# Patient Record
Sex: MALE | Race: WHITE | NOT HISPANIC OR LATINO | URBAN - METROPOLITAN AREA
[De-identification: names, ages, dates, MRNs, and addresses within clinical notes are randomized per-mention and may not be internally consistent; named-entity substitution may affect disease eponyms.]

---

## 2020-01-28 ENCOUNTER — TRANSFERRED RECORDS (OUTPATIENT)
Dept: HEALTH INFORMATION MANAGEMENT | Facility: CLINIC | Age: 50
End: 2020-01-28

## 2020-02-18 ENCOUNTER — OFFICE VISIT (OUTPATIENT)
Dept: OPHTHALMOLOGY | Facility: CLINIC | Age: 50
End: 2020-02-18
Attending: OPHTHALMOLOGY
Payer: COMMERCIAL

## 2020-02-18 DIAGNOSIS — H53.10 SUBJECTIVE VISUAL DISTURBANCE: Primary | ICD-10-CM

## 2020-02-18 DIAGNOSIS — H53.10 SUBJECTIVE VISUAL DISTURBANCE: ICD-10-CM

## 2020-02-18 DIAGNOSIS — H53.40 VISUAL FIELD DEFECT: Primary | ICD-10-CM

## 2020-02-18 DIAGNOSIS — H33.193 BULLOUS RETINOSCHISIS OF BOTH EYES: ICD-10-CM

## 2020-02-18 DIAGNOSIS — H33.22 RETINAL DETACHMENT, LEFT: ICD-10-CM

## 2020-02-18 DIAGNOSIS — H33.22 LEFT RETINAL DETACHMENT: Primary | ICD-10-CM

## 2020-02-18 PROCEDURE — 92134 CPTRZ OPH DX IMG PST SGM RTA: CPT | Mod: ZF | Performed by: OPHTHALMOLOGY

## 2020-02-18 PROCEDURE — G0463 HOSPITAL OUTPT CLINIC VISIT: HCPCS | Mod: ZF | Performed by: TECHNICIAN/TECHNOLOGIST

## 2020-02-18 PROCEDURE — 92133 CPTRZD OPH DX IMG PST SGM ON: CPT | Mod: ZF | Performed by: OPHTHALMOLOGY

## 2020-02-18 PROCEDURE — 40000269 ZZH STATISTIC NO CHARGE FACILITY FEE: Mod: ZF

## 2020-02-18 PROCEDURE — 92083 EXTENDED VISUAL FIELD XM: CPT | Mod: ZF | Performed by: OPHTHALMOLOGY

## 2020-02-18 PROCEDURE — 92250 FUNDUS PHOTOGRAPHY W/I&R: CPT | Mod: ZF | Performed by: OPHTHALMOLOGY

## 2020-02-18 ASSESSMENT — TONOMETRY
OD_IOP_MMHG: 18
IOP_METHOD: TONOPEN
OS_IOP_MMHG: 18
IOP_METHOD: ICARE
OD_IOP_MMHG: 18
OS_IOP_MMHG: 18

## 2020-02-18 ASSESSMENT — VISUAL ACUITY
OD_SC: 20/20
METHOD: SNELLEN - LINEAR
OS_SC: 20/20
OD_SC: 20/20
OS_SC: 20/20
METHOD: SNELLEN - LINEAR

## 2020-02-18 ASSESSMENT — SLIT LAMP EXAM - LIDS
COMMENTS: NORMAL

## 2020-02-18 ASSESSMENT — CUP TO DISC RATIO
OD_RATIO: 0.4
OS_RATIO: 0.65
OS_RATIO: 0.6
OD_RATIO: 0.6

## 2020-02-18 ASSESSMENT — CONF VISUAL FIELD
OD_SUPERIOR_NASAL_RESTRICTION: 3
METHOD: COUNTING FINGERS

## 2020-02-18 ASSESSMENT — EXTERNAL EXAM - LEFT EYE
OS_EXAM: NORMAL
OS_EXAM: NORMAL

## 2020-02-18 ASSESSMENT — EXTERNAL EXAM - RIGHT EYE
OD_EXAM: NORMAL
OD_EXAM: NORMAL

## 2020-02-18 NOTE — PROGRESS NOTES
Assessment & Plan     Jc Burciaga is a 49 year old male with the following diagnoses:   1. Visual field defect    2. Subjective visual disturbance    3. Retinal detachment, left         Patient is a 49 year old male sent by Dr. Les Nevarez from [facility] in [place] for consultation of visual field defect. Two years ago, patient had inconclusive visual field loss in left eye and was followed until last July, where visual field began showing deficits that were confirmed on 1/28/20. He initially did not notice any deficit but now is aware of it and is unsure if it is progressing. He notices occasional floaters and intermittent blurring in his left eye. Eyes are dry in the morning which resolves through the day but has no eye pain, excess tearing, redness, headaches or double vision. No vision loss on right. No trouble driving at night, no color vision loss.    POH: none  PMH: GERD (on prilosec), no hypertension, diabetes  FH: no history of glaucoma    Visual acuity is 20/20 in both eyes. Intraocular pressure is 18 both eyes. Pupils normal without afferent pupillary defect. Color plates full both eyes. Confrontational visual fields with superonasal defect right eye. Motility full both eyes. He has a subtle inferior Retinal detachment  LEFT eye.      Visual fields show superior nasal field loss in left eye, normal right eye. OCT rNFL shows thinning of temporal inferior disc in left eye.    It is my impression that he has an inferior Retinal detachment  LEFT eye causing a superior visual field defect.  Recommend see retina today.  Follow up with me as needed for worsening symptoms.           Attending Physician Attestation:  Complete documentation of historical and exam elements from today's encounter can be found in the full encounter summary report (not reduplicated in this progress note).  I personally obtained the chief complaint(s) and history of present illness.  I confirmed and edited as  necessary the review of systems, past medical/surgical history, family history, social history, and examination findings as documented by others; and I examined the patient myself.  I personally reviewed the relevant tests, images, and reports as documented above.  I formulated and edited as necessary the assessment and plan and discussed the findings and management plan with the patient and family. I was present with the medical student who participated in the service and in the documentation of this note. - MD Keith Yoon, MS4  Medical Student

## 2020-02-18 NOTE — Clinical Note
2/18/2020       RE: Jc Burciaga  [address] [address] [place] [address]     Dear Colleague,    Thank you for referring your patient, Jc Burciaga, to the EYE CLINIC at [facility]. Please see a copy of my visit note below.           Assessment & Plan     Jc Burciaga is a 49 year old male with the following diagnoses:   No diagnosis found.     Patient is a 49 year old male sent by Dr. Les Nevarez from [facility] in [place] for consultation of visual field defect. Two years ago, patient had inconclusive visual field loss in left eye and was followed until last July, where visual field began showing deficits that were confirmed on 1/28/20. He initially did not notice any deficit but now is aware of it and is unsure if it is progressing. He notices occasional floaters and intermittent blurring in his left eye. Eyes are dry in the morning which resolves through the day but has no eye pain, excess tearing, redness, headaches or double vision. No vision loss on right. No trouble driving at night, no color vision loss.    POH: none  PMH: GERD (on prilosec), no hypertension, diabetes  FH: no history of glaucoma    Visual acuity is 20/20 in both eyes. Intraocular pressure is 18 both eyes. Pupils normal without afferent pupillary defect. Color plates full both eyes. Confrontational visual fields with superonasal defect right eye. Motility full both eyes. Slit lamp exam ***. Dilated fundus exam ***    Visual fields show superior nasal field loss in left eye, normal right eye. OCT rNFL shows thinning of temporal inferior disc in left eye.    It is my impression that ***.          Keith Vazquez, MS4  Medical Student           Assessment & Plan     Jc Burciaga is a 49 year old male with the following diagnoses:   1. Visual field defect    2. Subjective visual disturbance    3. Retinal detachment, left         Patient is a 49 year old male sent by Dr. Les Nevarez  from [facility] in [place] for consultation of visual field defect. Two years ago, patient had inconclusive visual field loss in left eye and was followed until last July, where visual field began showing deficits that were confirmed on 1/28/20. He initially did not notice any deficit but now is aware of it and is unsure if it is progressing. He notices occasional floaters and intermittent blurring in his left eye. Eyes are dry in the morning which resolves through the day but has no eye pain, excess tearing, redness, headaches or double vision. No vision loss on right. No trouble driving at night, no color vision loss.    POH: none  PMH: GERD (on prilosec), no hypertension, diabetes  FH: no history of glaucoma    Visual acuity is 20/20 in both eyes. Intraocular pressure is 18 both eyes. Pupils normal without afferent pupillary defect. Color plates full both eyes. Confrontational visual fields with superonasal defect right eye. Motility full both eyes. He has a subtle inferior Retinal detachment  LEFT eye.      Visual fields show superior nasal field loss in left eye, normal right eye. OCT rNFL shows thinning of temporal inferior disc in left eye.    It is my impression that he has an inferior Retinal detachment  LEFT eye causing a superior visual field defect.  Recommend see retina today.  Follow up with me as needed for worsening symptoms.           Attending Physician Attestation:  Complete documentation of historical and exam elements from today's encounter can be found in the full encounter summary report (not reduplicated in this progress note).  I personally obtained the chief complaint(s) and history of present illness.  I confirmed and edited as necessary the review of systems, past medical/surgical history, family history, social history, and examination findings as documented by others; and I examined the patient myself.  I personally reviewed the relevant tests, images, and reports as documented  above.  I formulated and edited as necessary the assessment and plan and discussed the findings and management plan with the patient and family. I was present with the medical student who participated in the service and in the documentation of this note. - MD Keith Yoon, MS4  Medical Student    Again, thank you for allowing me to participate in the care of your patient.      Sincerely,    Santi Johnson MD

## 2020-02-18 NOTE — NURSING NOTE
Chief Complaint(s) and History of Present Illness(es)     New Patient     In left eye (Consult for visual field loss).  Associated symptoms include floaters.  Negative for double vision, glare and haloes.              Comments     Patient report grayness spot in upper nasal quadrant of his left eye. History of high ocular pressure 2 years ago, never on any eye drops.  +sinus. -no neuroimaging.     Family history: Brother had similar symptoms and had stents put into tear ducts    ADDISON Bailey 2/18/2020 8:34 AM

## 2020-02-18 NOTE — LETTER
2020         RE:  :  MRN: Jc Burciaga  1970  [id number]     Dear Dr. Nevarez,    Thank you for asking me to see your very pleasant patient, Jc Burciaga, in neuro-ophthalmic consultation.  I would like to thank you for sending your records and I have summarized them in the history of present illness.  My assessment and plan are below.  For further details, please see my attached clinic note.      Assessment & Plan     Jc Burciaga is a 49 year old male with the following diagnoses:   1. Visual field defect    2. Subjective visual disturbance    3. Retinal detachment, left       Patient is a 49 year old male sent by Dr. Les Nevarez from [facility] in [place] for consultation of visual field defect. Two years ago, patient had inconclusive visual field loss in left eye and was followed until last July, where visual field began showing deficits that were confirmed on 20. He initially did not notice any deficit but now is aware of it and is unsure if it is progressing. He notices occasional floaters and intermittent blurring in his left eye. Eyes are dry in the morning which resolves through the day but has no eye pain, excess tearing, redness, headaches or double vision. No vision loss on right. No trouble driving at night, no color vision loss.    POH: none  PMH: GERD (on prilosec), no hypertension, diabetes  FH: no history of glaucoma    Visual acuity is 20/20 in both eyes. Intraocular pressure is 18 both eyes. Pupils normal without afferent pupillary defect. Color plates full both eyes. Confrontational visual fields with superonasal defect right eye. Motility full both eyes. He has a subtle inferior Retinal detachment  LEFT eye.      Visual fields show superior nasal field loss in left eye, normal right eye. OCT rNFL shows thinning of temporal inferior disc in left eye.    It is my impression that he has an inferior Retinal detachment  LEFT eye causing a superior visual  field defect.  Recommend see retina today.  Follow up with me as needed for worsening symptoms.        Again, thank you for allowing me to participate in the care of your patient.      Sincerely,    Santi Johnson MD  Professor  Ophthalmology Residency   Director of Neuro-Ophthalmology  [employer] Endowed Chair  Departments of Ophthalmology, Neurology, and Neurosurgery  [facility] [address] [place]  [address]  T - [phone]  F - [phone]  [email or web address]      CC: Les Nevarez  [facility]  [address] [facility] [address]  VIA Facsimile: [phone]     Kannan Yu MD  [facility]  [address] [facility] [address]  [facility] [address]  VIA Facsimile: [phone]       DX = Retinal detachment, visual field defect

## 2020-02-19 ENCOUNTER — TELEPHONE (OUTPATIENT)
Dept: OPHTHALMOLOGY | Facility: CLINIC | Age: 50
End: 2020-02-19

## 2020-02-19 NOTE — TELEPHONE ENCOUNTER
Colored flashes are usually not due to retinal issues. He needs to be seen by retina in next 1-2 days though    Updated pt will work with facilitator with schedule appt per Dr. Laguerre's recommendations above and call back      Bo Narayan RN 1:42 PM 02/19/20        H/o retina detachment/schisis left eye     Spoke to pt at 1205  Last night after laying back down in bed noticed the colored flashes/floaty spot in one quadrant of vision 3 times last a second or two    No floaters   No other vision changes will review plan with Dr Laguerre and call back    Bo Narayan RN 12:10 PM 02/19/20          [facility] Call Center    Phone Message    May a detailed message be left on voicemail: yes , Pt is wanting to get a call back at confidential line and left a detailed message on voicemail.     Reason for Call: Symptoms or Concerns     If patient has red-flag symptoms, warm transfer to triage line    Current symptom or concern: Per Pt states when woke up last night from sleep, on left eye seen a couple jaime in his eye    Symptoms have been present for:  1 day(s)    Has patient previously been seen for this? No    By [facility]    Date: 2/19/2020    Are there any new or worsening symptoms? No      Action Taken: Message routed to:  Other: Eye    Travel Screening: Not Applicable

## 2020-02-20 NOTE — TELEPHONE ENCOUNTER
Scheduled with Dr. Puente at 1010 today    Pt aware of date/time/location  Bo Narayan RN 7:35 AM 02/21/20        [facility] Call Center    Phone Message    May a detailed message be left on voicemail: yes     Reason for Call: Other: Pt is waiting to hear back from Bo regarding messge below. Please call pt. Thank you.     Action Taken: Message routed to:  [facility]ee    Travel Screening: Not Applicable

## 2020-02-21 ENCOUNTER — TELEPHONE (OUTPATIENT)
Dept: OPHTHALMOLOGY | Facility: CLINIC | Age: 50
End: 2020-02-21

## 2020-02-21 ENCOUNTER — OFFICE VISIT (OUTPATIENT)
Dept: OPHTHALMOLOGY | Facility: CLINIC | Age: 50
End: 2020-02-21
Attending: OPHTHALMOLOGY
Payer: COMMERCIAL

## 2020-02-21 DIAGNOSIS — H33.22 LEFT RETINAL DETACHMENT: Primary | ICD-10-CM

## 2020-02-21 PROCEDURE — G0463 HOSPITAL OUTPT CLINIC VISIT: HCPCS | Mod: ZF

## 2020-02-21 PROCEDURE — 92134 CPTRZ OPH DX IMG PST SGM RTA: CPT | Mod: ZF | Performed by: OPHTHALMOLOGY

## 2020-02-21 ASSESSMENT — VISUAL ACUITY
OS_SC: 20/20
OD_SC: 20/20
METHOD: SNELLEN - LINEAR

## 2020-02-21 ASSESSMENT — EXTERNAL EXAM - RIGHT EYE: OD_EXAM: NORMAL

## 2020-02-21 ASSESSMENT — SLIT LAMP EXAM - LIDS: COMMENTS: NORMAL

## 2020-02-21 ASSESSMENT — CONF VISUAL FIELD
METHOD: TOYS
OS_SUPERIOR_TEMPORAL_RESTRICTION: 3

## 2020-02-21 ASSESSMENT — TONOMETRY
OD_IOP_MMHG: 25
IOP_METHOD: TONOPEN
OS_IOP_MMHG: 23

## 2020-02-21 ASSESSMENT — CUP TO DISC RATIO: OS_RATIO: 0.6

## 2020-02-21 ASSESSMENT — EXTERNAL EXAM - LEFT EYE: OS_EXAM: NORMAL

## 2020-02-21 NOTE — NURSING NOTE
Chief Complaints and History of Present Illnesses   Patient presents with     Flashes Left Eye     Chief Complaint(s) and History of Present Illness(es)     Flashes Left Eye     Laterality: left eye    Quality: arc (At night in the LE that started Tue and Wed in the am and nothing since.  )    Duration: days    Associated symptoms: floaters (occ or hazy cloudy but it has not changed)    Pain scale: 0/10              Comments     Nikki WARD 10:16 AM February 21, 2020

## 2020-02-21 NOTE — PROGRESS NOTES
CC -   RD OS    INTERVAL HISTORY - ?new flashes ?OS today AM, no change in scotoma noted.  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  Referred to Dr. Johnson by Dr. Les Nevarez ([facility] in [place]) for VFD OS.  Per records patient had ?VFD OS ~ 2018, worsened 7/2019, confirmed 1/28/20, no subjective changes noted per patient  No flashes, no trauma    PAST OCULAR SURGERY  None      RETINAL IMAGING:  OCT 2-21-20  OD -  Macula - retina normal,PHF attached    Periphery - schisis  OS -  Macula - SRF temporal macula, tr ERM, PVD - no change   Periphery - schisis      ASSESSMENT & PLAN    1. RD OS associated with schisis   - lattice OS is in attached retina   - unclear if progressive   - new flashes 2-21-20 but no change on exam or OCT   - observe closely   - recheck 1 month     - may need surgery if progresses   - would do PPV if progresses     - r/b/a d/w patient: vision loss, blindness, infection, bleeding   - retinal detachment, need for more surgeries, need for gas or oil bubble and bubble restrictions   - cataract, diplopia, refractive change   - persistent blurriness, distortion, or scotoma   - participation of fellow or resident        2. Schisis OU      3. Lattice OS   - advised S/Sx RD      4. Syneresis OD & PVD OS   - advised S/Sx RD 2/2020    5. OAG suspect   - CDR asymmetry   - mild irregularity on OCT-RNFL 2/2020    6.  RAYRAY OS   - artificial tears            return to clinic: 1 month, OCT OU      ATTESTATION     Attending Physician Attestation:      Complete documentation of historical and exam elements from today's encounter can be found in the full encounter summary report (not reduplicated in this progress note).  I personally obtained the chief complaint(s) and history of present illness.  I confirmed and edited as necessary the review of  systems, past medical/surgical history, family history, social history, and examination findings as documented by others; and I examined the patient myself.  I personally reviewed the relevant tests, images, and reports as documented above.  I formulated and edited as necessary the assessment and plan and discussed the findings and management plan with the patient and family    Mary Puente MD, PhD  , Vitreoretinal Surgery  Department of Ophthalmology  [facility]

## 2020-03-24 ENCOUNTER — OFFICE VISIT (OUTPATIENT)
Dept: OPHTHALMOLOGY | Facility: CLINIC | Age: 50
End: 2020-03-24
Attending: OPHTHALMOLOGY
Payer: COMMERCIAL

## 2020-03-24 DIAGNOSIS — H33.193 BULLOUS RETINOSCHISIS OF BOTH EYES: ICD-10-CM

## 2020-03-24 DIAGNOSIS — H33.193 BULLOUS RETINOSCHISIS OF BOTH EYES: Primary | ICD-10-CM

## 2020-03-24 DIAGNOSIS — H33.22 LEFT RETINAL DETACHMENT: Primary | ICD-10-CM

## 2020-03-24 PROCEDURE — 92134 CPTRZ OPH DX IMG PST SGM RTA: CPT | Mod: ZF | Performed by: OPHTHALMOLOGY

## 2020-03-24 PROCEDURE — G0463 HOSPITAL OUTPT CLINIC VISIT: HCPCS | Mod: ZF

## 2020-03-24 PROCEDURE — 92250 FUNDUS PHOTOGRAPHY W/I&R: CPT | Mod: ZF | Performed by: OPHTHALMOLOGY

## 2020-03-24 ASSESSMENT — VISUAL ACUITY
METHOD: SNELLEN - LINEAR
OD_SC+: -1
OS_SC: 20/15
OD_SC: 20/15
OS_SC+: -1

## 2020-03-24 ASSESSMENT — CONF VISUAL FIELD: OS_SUPERIOR_NASAL_RESTRICTION: 3

## 2020-03-24 ASSESSMENT — CUP TO DISC RATIO
OD_RATIO: 0.4
OS_RATIO: 0.6

## 2020-03-24 ASSESSMENT — TONOMETRY
OS_IOP_MMHG: 14
OD_IOP_MMHG: 14
IOP_METHOD: TONOPEN

## 2020-03-24 ASSESSMENT — EXTERNAL EXAM - RIGHT EYE: OD_EXAM: NORMAL

## 2020-03-24 ASSESSMENT — SLIT LAMP EXAM - LIDS
COMMENTS: NORMAL
COMMENTS: NORMAL

## 2020-03-24 ASSESSMENT — EXTERNAL EXAM - LEFT EYE: OS_EXAM: NORMAL

## 2020-03-24 NOTE — NURSING NOTE
Chief Complaints and History of Present Illnesses   Patient presents with     Follow Up     Left retinal detachment      Chief Complaint(s) and History of Present Illness(es)     Follow Up     Laterality: left eye    Course: stable    Associated symptoms: floaters, flashes and dryness.  Negative for redness, tearing and headache    Treatments tried: artificial tears    Pain scale: 0/10    Comments: Left retinal detachment               Comments     He states that his vision has seemed stable in both eyes, since his last eye exam.  However, he has been seeing more flickering and a halo effect with his left eye.  In the mornings when he wakes or at night he sees these shoot star like flashes.    Two weeks ago he had an episode where he saw centrally a goofy mirror type distortion and no peripheral vision for about 20 minutes. He did not have a headache at the time.    He tells me that when he reads his eyes feels a bit uncomfortable, strained.    SYLVIA Hogue 10:27 AM  March 24, 2020

## 2020-05-26 ENCOUNTER — OFFICE VISIT (OUTPATIENT)
Dept: OPHTHALMOLOGY | Facility: CLINIC | Age: 50
End: 2020-05-26
Attending: OPHTHALMOLOGY
Payer: COMMERCIAL

## 2020-05-26 DIAGNOSIS — H33.22 LEFT RETINAL DETACHMENT: ICD-10-CM

## 2020-05-26 DIAGNOSIS — H33.193 BULLOUS RETINOSCHISIS OF BOTH EYES: Primary | ICD-10-CM

## 2020-05-26 DIAGNOSIS — H53.10 SUBJECTIVE VISUAL DISTURBANCE: ICD-10-CM

## 2020-05-26 DIAGNOSIS — H53.10 SUBJECTIVE VISUAL DISTURBANCE: Primary | ICD-10-CM

## 2020-05-26 PROCEDURE — 92134 CPTRZ OPH DX IMG PST SGM RTA: CPT | Mod: ZF | Performed by: OPHTHALMOLOGY

## 2020-05-26 PROCEDURE — G0463 HOSPITAL OUTPT CLINIC VISIT: HCPCS | Mod: ZF

## 2020-05-26 ASSESSMENT — SLIT LAMP EXAM - LIDS
COMMENTS: NORMAL
COMMENTS: NORMAL

## 2020-05-26 ASSESSMENT — TONOMETRY
OD_IOP_MMHG: 18
OS_IOP_MMHG: 18
IOP_METHOD: TONOPEN

## 2020-05-26 ASSESSMENT — VISUAL ACUITY
METHOD: SNELLEN - LINEAR
OS_SC: 20/20
OS_SC+: -1
OD_SC: 20/15
OD_SC+: -2

## 2020-05-26 ASSESSMENT — CUP TO DISC RATIO
OD_RATIO: 0.4
OS_RATIO: 0.6

## 2020-05-26 ASSESSMENT — CONF VISUAL FIELD
OS_NORMAL: 1
OD_NORMAL: 1
METHOD: COUNTING FINGERS

## 2020-05-26 ASSESSMENT — EXTERNAL EXAM - RIGHT EYE: OD_EXAM: NORMAL

## 2020-05-26 ASSESSMENT — EXTERNAL EXAM - LEFT EYE: OS_EXAM: NORMAL

## 2020-05-26 NOTE — PROGRESS NOTES
CC -   RD and retinoschisis OS    INTERVAL HISTORY - VA stable, occasional flashes stable vs better, OS irritation fluctuates stable      HPI -   Jc Burciaga is a  49 year old year-old patient referred by Dr Johnson for evaluation and treat of a mac on retinal detachment left eye.  Referred to Dr. Johnson by Dr. Les Nevarez ([facility] in [place]) for VFD OS.  Per records patient had ?VFD OS ~ 2018, worsened 7/2019, confirmed 1/28/20, no subjective changes noted per patient  No trauma    PAST OCULAR SURGERY  None      RETINAL IMAGING:  OCT 5-26-20  OD -  Macula - retina normal,PHF attached    Periphery - schisis stable  OS -  Macula - SRF temporal macula, tr ERM, PVD - no change   Periphery - schisis no change        ASSESSMENT & PLAN    1. RD OS associated with schisis   - lattice OS is in attached retina not related to RD   - has noticed VFD but unchanged since noticed   - unclear if progressive initially   - no progression compared to 2/2020   - new flashes but no change on exam or OCT     - observe closely,   - could consider barrier laser but given posterior location likely to worsen VFD   - d/w patient observation vs barrier, will continue observation     - recheck 2-3  month     - may need surgery if progresses   - would do PPV if progresses        2. Schisis OU      3. Lattice OS   - advised S/Sx RD      4. Syneresis OD & PVD OS   - advised S/Sx RD 2/2020    5. OAG suspect   - CDR asymmetry   - mild irregularity on OCT-RNFL 2/2020    6.  RAYRAY OS   - artificial tears      return to clinic: 2 month, OCT each eye        ATTESTATION     Attending Physician Attestation:      Complete documentation of historical and exam elements from today's encounter can be found in the full encounter summary report (not reduplicated in this progress note).  I personally obtained the chief complaint(s) and history of present illness.  I confirmed and edited as necessary the review of systems, past medical/surgical  history, family history, social history, and examination findings as documented by others; and I examined the patient myself.  I personally reviewed the relevant tests, images, and reports as documented above.  I formulated and edited as necessary the assessment and plan and discussed the findings and management plan with the patient and family    Mary Puente MD, PhD  , Vitreoretinal Surgery  Department of Ophthalmology  [facility]

## 2020-05-26 NOTE — NURSING NOTE
Chief Complaints and History of Present Illnesses   Patient presents with     Follow Up     2 month follow up RD OS associated with schisis     Chief Complaint(s) and History of Present Illness(es)     Follow Up     Comments: 2 month follow up RD OS associated with schisis              Comments     Pt states vision is about the same as last visit. No eye pain today.   No new flashes or floaters. No redness. Dryness in both eyes, relief with drops.    FLORENTIN Bass May 26, 2020 9:51 AM

## 2020-09-21 DIAGNOSIS — H33.22 LEFT RETINAL DETACHMENT: Primary | ICD-10-CM

## 2020-09-29 ENCOUNTER — OFFICE VISIT (OUTPATIENT)
Dept: OPHTHALMOLOGY | Facility: CLINIC | Age: 50
End: 2020-09-29
Attending: OPHTHALMOLOGY
Payer: COMMERCIAL

## 2020-09-29 DIAGNOSIS — H33.193 BULLOUS RETINOSCHISIS OF BOTH EYES: Primary | ICD-10-CM

## 2020-09-29 DIAGNOSIS — H33.22 LEFT RETINAL DETACHMENT: ICD-10-CM

## 2020-09-29 PROCEDURE — 92134 CPTRZ OPH DX IMG PST SGM RTA: CPT | Mod: ZF | Performed by: OPHTHALMOLOGY

## 2020-09-29 PROCEDURE — G0463 HOSPITAL OUTPT CLINIC VISIT: HCPCS | Mod: ZF

## 2020-09-29 ASSESSMENT — SLIT LAMP EXAM - LIDS
COMMENTS: NORMAL
COMMENTS: NORMAL

## 2020-09-29 ASSESSMENT — CUP TO DISC RATIO
OD_RATIO: 0.4
OS_RATIO: 0.6

## 2020-09-29 ASSESSMENT — TONOMETRY
IOP_METHOD: TONOPEN
OS_IOP_MMHG: 16
OD_IOP_MMHG: 15

## 2020-09-29 ASSESSMENT — VISUAL ACUITY
OS_SC: 20/15
OS_SC+: -1
OD_SC: 20/15
METHOD: SNELLEN - LINEAR

## 2020-09-29 ASSESSMENT — EXTERNAL EXAM - RIGHT EYE: OD_EXAM: NORMAL

## 2020-09-29 ASSESSMENT — EXTERNAL EXAM - LEFT EYE: OS_EXAM: NORMAL

## 2020-09-29 ASSESSMENT — CONF VISUAL FIELD
OS_NORMAL: 1
METHOD: COUNTING FINGERS
OD_NORMAL: 1

## 2020-09-29 NOTE — PROGRESS NOTES
CC -   RD and retinoschisis OS    INTERVAL HISTORY - VA stable, occasional flashes stable at night      HPI -   Jc Burciaga is a  50  year old year-old patient referred by Dr Johnson for evaluation and treat of a mac on retinal detachment left eye.  Referred to Dr. Johnson by Dr. Les Nevarez ([facility] in [place]) for VFD OS.  Per records patient had ?VFD OS ~ 2018, worsened 7/2019, confirmed 1/28/20, no subjective changes noted per patient  No trauma    PAST OCULAR SURGERY  None      RETINAL IMAGING:  OCT 9-29-20  OD -  Macula - retina normal,PHF attached    Periphery - schisis stable  OS -  Macula - SRF temporal macula, tr ERM, PVD - no change   Periphery - schisis no change        ASSESSMENT & PLAN    1. RD OS associated with schisis   - first noticed VFD 2/2020 no change since onset   - lattice OS is in attached retina not related to RD     - unclear if progressive initially   - no progression compared to 2/2020   - new flashes but no change on exam or OCT     - observe closely,   - could consider barrier laser but given posterior location likely to worsen VFD   - d/w patient observation vs barrier, will continue observation     - recheck 4    Months      - may need surgery if progresses   - would do PPV if progresses        2. Schisis OU      3. Lattice OS   - advised S/Sx RD      4. Syneresis OD & PVD OS   - advised S/Sx RD 2/2020    5. OAG suspect   - CDR asymmetry   - mild irregularity on OCT-RNFL 2/2020    6.  RAYRAY OS   - artificial tears      return to clinic: 4  month, OCT each eye        ATTESTATION     Attending Physician Attestation:      Complete documentation of historical and exam elements from today's encounter can be found in the full encounter summary report (not reduplicated in this progress note).  I personally obtained the chief complaint(s) and history of present illness.  I confirmed and edited as necessary the review of systems, past medical/surgical history, family history, social  history, and examination findings as documented by others; and I examined the patient myself.  I personally reviewed the relevant tests, images, and reports as documented above.  I formulated and edited as necessary the assessment and plan and discussed the findings and management plan with the patient and family    Mary Puente MD, PhD  , Vitreoretinal Surgery  Department of Ophthalmology  [facility]

## 2020-09-29 NOTE — NURSING NOTE
"Chief Complaint(s) and History of Present Illness(es)     Follow Up     In left eye.  Associated symptoms include flashes, floaters, dryness and eye pain.  Negative for redness and tearing.  Pain was noted as 3/10.              Comments     4 month f/u for RD OS associated with schisis. Pt notes for the most part vision has been stable. Pt still notes seeing \"shooting starts\" in his vision LE>RE. The floaters are still the same no changes. BE seem pretty dry in the AM when he first wakes up x the last few months. When pt is tired pt does note a dull ache in the LE intermittently x the last few months.     Ocular med: ATs prn BE    FLORENTIN Gates 10:28 AM September 29, 2020                   "

## 2021-01-25 DIAGNOSIS — H33.193 BULLOUS RETINOSCHISIS OF BOTH EYES: Primary | ICD-10-CM

## 2021-02-02 ENCOUNTER — OFFICE VISIT (OUTPATIENT)
Dept: OPHTHALMOLOGY | Facility: CLINIC | Age: 51
End: 2021-02-02
Attending: OPHTHALMOLOGY
Payer: COMMERCIAL

## 2021-02-02 DIAGNOSIS — H33.193 BULLOUS RETINOSCHISIS OF BOTH EYES: ICD-10-CM

## 2021-02-02 PROCEDURE — G0463 HOSPITAL OUTPT CLINIC VISIT: HCPCS

## 2021-02-02 PROCEDURE — 99213 OFFICE O/P EST LOW 20 MIN: CPT | Performed by: OPHTHALMOLOGY

## 2021-02-02 PROCEDURE — 92134 CPTRZ OPH DX IMG PST SGM RTA: CPT | Performed by: OPHTHALMOLOGY

## 2021-02-02 ASSESSMENT — TONOMETRY
IOP_METHOD: TONOPEN
OD_IOP_MMHG: 15
OS_IOP_MMHG: 20

## 2021-02-02 ASSESSMENT — CUP TO DISC RATIO
OS_RATIO: 0.6
OD_RATIO: 0.4

## 2021-02-02 ASSESSMENT — VISUAL ACUITY
METHOD: SNELLEN - LINEAR
OD_SC: 20/15-
OS_SC: 20/15-3

## 2021-02-02 ASSESSMENT — EXTERNAL EXAM - LEFT EYE: OS_EXAM: NORMAL

## 2021-02-02 ASSESSMENT — CONF VISUAL FIELD
OS_NORMAL: 1
OD_NORMAL: 1
METHOD: COUNTING FINGERS

## 2021-02-02 ASSESSMENT — EXTERNAL EXAM - RIGHT EYE: OD_EXAM: NORMAL

## 2021-02-02 ASSESSMENT — SLIT LAMP EXAM - LIDS
COMMENTS: NORMAL
COMMENTS: NORMAL

## 2021-02-02 NOTE — PROGRESS NOTES
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  Referred to Dr. Johnson by Dr. Les Nevarez ([facility] in [place]) for VFD OS.  Per records patient had ?VFD OS ~ 2018, worsened 7/2019, confirmed 1/28/20, no subjective changes noted per patient  No trauma    PAST OCULAR SURGERY  None      RETINAL IMAGING:  OCT 2-2-21  OD -  Macula - retina normal,PHF attached    Periphery - schisis stable  OS -  Macula - SRF temporal macula, tr ERM, PVD -?slight change (2/2021)   Periphery - schisis stable ? slightly worse (2/2021)        ASSESSMENT & PLAN    #. RD OS associated with schisis   - first noticed VFD 2/2020 no change since onset   - lattice OS is in attached retina not related to RD     - unclear if progressive initially   - stable 2/2020 - 2/2021     - ?slight progression 2/2/21    - no subjective change     - observe closely,   - could consider barrier laser but given posterior location likely to worsen VFD   - d/w patient observation vs barrier, will continue observation     - recheck 3- 4    Months      - may need surgery if progresses   - would do PPV/FGx if progresses        #. Schisis OU      #  Lattice OS   - advised S/Sx RD 2/2021      #. Syneresis OD & PVD OS      #. 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  I personally obtained the chief complaint(s) and history of present illness.  I confirmed and edited as necessary the review of systems, past medical/surgical  history, family history, social history, and examination findings as documented by others; and I examined the patient myself.  I personally reviewed the relevant tests, images, and reports as documented above.  I formulated and edited as necessary the assessment and plan and discussed the findings and management plan with the patient and family    Mary Puente MD, PhD  , Vitreoretinal Surgery  Department of Ophthalmology  [facility]

## 2021-05-27 ENCOUNTER — RECORDS - HEALTHEAST (OUTPATIENT)
Dept: ADMINISTRATIVE | Facility: CLINIC | Age: 51
End: 2021-05-27

## 2021-05-28 ENCOUNTER — RECORDS - HEALTHEAST (OUTPATIENT)
Dept: ADMINISTRATIVE | Facility: CLINIC | Age: 51
End: 2021-05-28

## 2021-06-02 DIAGNOSIS — H33.193 BULLOUS RETINOSCHISIS OF BOTH EYES: Primary | ICD-10-CM

## 2022-03-30 NOTE — PROGRESS NOTES
CC -   RD and retinoschisis OS    INTERVAL HISTORY - ?new flashes ?OS today AM, no change in scotoma noted.  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  Referred to Dr. Johnson by Dr. Les Nevarez ([facility] in [place]) for VFD OS.  Per records patient had ?VFD OS ~ 2018, worsened 7/2019, confirmed 1/28/20, no subjective changes noted per patient  No trauma    PAST OCULAR SURGERY  None      RETINAL IMAGING:  OCT 3-24-20  OD -  Macula - retina normal,PHF attached    Periphery - schisis  OS -  Macula - SRF temporal macula, tr ERM, PVD - no change   Periphery - schisis no change    Optos 3-  right eye stable  Left IT schisis no change; possible atrophic hole in inner wall      ASSESSMENT & PLAN    1. RD OS associated with schisis   - lattice OS is in attached retina not related to RD   - has noticed VFD but unchanged since noticed   - unclear if progressive initially   - no progression compared to 2/2020   - new flashes but no change on exam or OCT     - observe closely,   - could consider barrier laser but given posterior location likely to worsen VFD   - d/w patient observation vs barrier, will continue observation     - recheck 2 month     - may need surgery if progresses   - would do PPV if progresses        2. Schisis OU      3. Lattice OS   - advised S/Sx RD      4. Syneresis OD & PVD OS   - advised S/Sx RD 2/2020    5. OAG suspect   - CDR asymmetry   - mild irregularity on OCT-RNFL 2/2020    6.  RAYRAY OS   - artificial tears      return to clinic: 2month, OCT each eye    Eber Laguerre MD  Vitreoretinal surgery fellow  [facility]        ATTESTATION     Attending Physician Attestation:      Complete documentation of historical and exam elements from today's encounter can be found in the full encounter summary report (not reduplicated  Sent pt a portal message   in this progress note).  I personally obtained the chief complaint(s) and history of present illness.  I confirmed and edited as necessary the review of systems, past medical/surgical history, family history, social history, and examination findings as documented by others; and I examined the patient myself.  I personally reviewed the relevant tests, images, and reports as documented above.  I personally reviewed the ophthalmic test(s) associated with this encounter, agree with the interpretation(s) as documented by the resident/fellow, and have edited the corresponding report(s) as necessary.   I formulated and edited as necessary the assessment and plan and discussed the findings and management plan with the patient and family    Mary Puente MD, PhD  , Vitreoretinal Surgery  Department of Ophthalmology  [facility]

## 2022-05-17 NOTE — TELEPHONE ENCOUNTER
Spoke to pt at 1220    Possible referral from Dr. Johnson to Dr. Puente in our system.  Pt believes taken care of at this time and no further action needed-- pt will call if needs any further assistance  Bo Narayan RN 12:25 PM 02/21/20        [facility] Call Center    Phone Message    May a detailed message be left on voicemail: yes     Reason for Call: Other: Pt calling needing a insurance referral to see  to [facility]. Recieved no other information please reach out to pt with any further questions.     Action Taken: Message routed to:  [facility]    Travel Screening: Not Applicable                                                                      
Statement Selected